# Patient Record
Sex: FEMALE | Race: WHITE | NOT HISPANIC OR LATINO | Employment: FULL TIME | URBAN - METROPOLITAN AREA
[De-identification: names, ages, dates, MRNs, and addresses within clinical notes are randomized per-mention and may not be internally consistent; named-entity substitution may affect disease eponyms.]

---

## 2018-08-11 ENCOUNTER — APPOINTMENT (EMERGENCY)
Dept: RADIOLOGY | Facility: HOSPITAL | Age: 38
End: 2018-08-11

## 2018-08-11 ENCOUNTER — HOSPITAL ENCOUNTER (EMERGENCY)
Facility: HOSPITAL | Age: 38
Discharge: HOME | End: 2018-08-12
Attending: EMERGENCY MEDICINE | Admitting: EMERGENCY MEDICINE

## 2018-08-11 VITALS
SYSTOLIC BLOOD PRESSURE: 148 MMHG | OXYGEN SATURATION: 94 % | TEMPERATURE: 98.4 F | DIASTOLIC BLOOD PRESSURE: 78 MMHG | HEART RATE: 100 BPM | RESPIRATION RATE: 16 BRPM

## 2018-08-11 DIAGNOSIS — S00.81XA ABRASION OF FACE, INITIAL ENCOUNTER: ICD-10-CM

## 2018-08-11 DIAGNOSIS — S06.0X1A CONCUSSION WITH LOSS OF CONSCIOUSNESS OF 30 MINUTES OR LESS, INITIAL ENCOUNTER: Primary | ICD-10-CM

## 2018-08-11 DIAGNOSIS — F10.920 ALCOHOLIC INTOXICATION WITHOUT COMPLICATION (HCC): ICD-10-CM

## 2018-08-11 DIAGNOSIS — S02.5XXA CLOSED FRACTURE OF TOOTH, INITIAL ENCOUNTER: ICD-10-CM

## 2018-08-11 PROCEDURE — 70486 CT MAXILLOFACIAL W/O DYE: CPT

## 2018-08-11 PROCEDURE — 72125 CT NECK SPINE W/O DYE: CPT

## 2018-08-11 PROCEDURE — 70450 CT HEAD/BRAIN W/O DYE: CPT

## 2018-08-11 PROCEDURE — 93005 ELECTROCARDIOGRAM TRACING: CPT

## 2018-08-12 LAB
ATRIAL RATE: 100 BPM
P AXIS: 71 DEGREES
PR INTERVAL: 154 MS
QRS AXIS: 69 DEGREES
QRSD INTERVAL: 74 MS
QT INTERVAL: 324 MS
QTC INTERVAL: 417 MS
T WAVE AXIS: 21 DEGREES
VENTRICULAR RATE: 100 BPM

## 2018-08-12 PROCEDURE — 93010 ELECTROCARDIOGRAM REPORT: CPT | Performed by: INTERNAL MEDICINE

## 2018-08-12 PROCEDURE — 99284 EMERGENCY DEPT VISIT MOD MDM: CPT

## 2018-08-12 NOTE — DISCHARGE INSTRUCTIONS
Concussion   WHAT YOU NEED TO KNOW:   A concussion is a mild brain injury  It is usually caused by a bump or blow to the head from a fall, a motor vehicle crash, or a sports injury  Sometimes being shaken forcefully may cause a concussion  DISCHARGE INSTRUCTIONS:   Have someone else call 911 for the following:   · Someone tries to wake you and cannot do so  · You have a seizure, increasing confusion, or a change in personality  · Your speech becomes slurred, or you have new vision problems  Seek care immediately if:   · You have a severe headache that does not go away  · You have arm or leg weakness, numbness, or new problems with coordination  · You have blood or clear fluid coming out of the ears or nose  Contact your healthcare provider if:   · You have nausea or are vomiting  · You feel more sleepy than usual     · Your symptoms get worse  · Your symptoms last longer than 6 weeks after the injury  · You have questions or concerns about your condition or care  Medicines:   · Acetaminophen  helps to decrease pain  It is available without a doctor's order  Ask how much to take and how often to take it  Follow directions  Acetaminophen can cause liver damage if not taken correctly  · NSAIDs , such as ibuprofen, help decrease swelling and pain  NSAIDs can cause stomach bleeding or kidney problems in certain people  If you take blood thinner medicine, always ask your healthcare provider if NSAIDs are safe for you  Always read the medicine label and follow directions  · Take your medicine as directed  Contact your healthcare provider if you think your medicine is not helping or if you have side effects  Tell him or her if you are allergic to any medicine  Keep a list of the medicines, vitamins, and herbs you take  Include the amounts, and when and why you take them  Bring the list or the pill bottles to follow-up visits   Carry your medicine list with you in case of an emergency  Follow up with your healthcare provider as directed:  Write down your questions so you remember to ask them during your visits  Self-care:   · Rest  from physical and mental activities as directed  Mental activities are those that require thinking, concentration, and attention  You will need to rest until your symptoms are gone  Rest will allow you to recover from your concussion  Ask your healthcare provider when you can return to work and other daily activities  · Have someone stay with you for the first 24 hours after your injury  Your healthcare provider should be contacted if your symptoms get worse, or you develop new symptoms  · Do not participate in sports and physical activities until your healthcare provider says it is okay  They could make your symptoms worse or lead to another concussion  Your healthcare provider will tell you when it is okay for you to return to sports or physical activities  Prevent another concussion:   · Wear protective sports equipment that fit properly  Helmets help decrease your risk of a serious brain injury  Talk to your healthcare provider about ways that can decrease your risk for a concussion if you play sports  · Wear your seat belt  every time you travel  This helps to decrease your risk of a head injury if you are in a car accident  © 2017 [address] [facility] Information is for End User's use only and may not be sold, redistributed or otherwise used for commercial purposes  All illustrations and images included in CareNotes® are the copyrighted property of [employer] , [employer]  or Kurt Cortez  The above information is an  only  It is not intended as medical advice for individual conditions or treatments  Talk to your doctor, nurse or pharmacist before following any medical regimen to see if it is safe and effective for you

## 2018-08-12 NOTE — ED PROVIDER NOTES
History  Chief Complaint   Patient presents with   Murlene Mariposa onto ground from standing at [facility]   Pt  states she has been drinking alcohol tonight  Fall was witnessed by PD, who stated she had a brief syncopal episode     Patient is a 53-year-old female with no pertinent past medical history/no daily medications presenting to the ED today for a witnessed fall by police at [facility])   Patient states she was drinking today [facility]) when she was walking back to her car stumbled and hit her head, she states she remembers everything that happened and that she was not going to drive she was just getting back to her car  Police state that she had a transient syncopal event, by the time they go to her she was awake and oriented, they brought her in for evaluation  At the current time patient is complaining of left cheek, head pain and upper lip pain  Denies dizziness/lightheadedness, neck pain, chest pain, shortness of breath, abdominal pain, nausea/vomiting, diarrhea/constipation, dysuria, arthralgias, focal neurologic deficits, mandibular or tooth pain  History provided by:  Patient and police   used: No        None       History reviewed  No pertinent past medical history  No past surgical history on file  History reviewed  No pertinent family history  I have reviewed and agree with the history as documented  Social History   Substance Use Topics    Smoking status: Not on file    Smokeless tobacco: Not on file    Alcohol use Not on file        Review of Systems   Constitutional: Negative for activity change, diaphoresis, fatigue and fever  HENT: Positive for facial swelling (Left cheek)  Negative for mouth sores, nosebleeds, postnasal drip, rhinorrhea, sinus pain, sinus pressure, sneezing and sore throat  Eyes: Negative for photophobia, pain, redness and visual disturbance     Respiratory: Negative for apnea, cough, chest tightness, shortness of breath, wheezing and stridor  Cardiovascular: Negative for chest pain, palpitations and leg swelling  Gastrointestinal: Negative for abdominal distention, abdominal pain, constipation, diarrhea, nausea and vomiting  Genitourinary: Negative for difficulty urinating, dysuria, frequency and urgency  Musculoskeletal: Negative for arthralgias, back pain, gait problem, joint swelling, myalgias, neck pain and neck stiffness  Skin: Negative for wound  Neurological: Negative for dizziness, weakness, light-headedness, numbness and headaches  Physical Exam  ED Triage Vitals [08/11/18 2211]   Temperature Pulse Resp Blood Pressure SpO2   98 4 °F (36 9 °C) (!) 108 -- 159/79 94 %      Temp Source Heart Rate Source Patient Position - Orthostatic VS BP Location FiO2 (%)   Oral Monitor Sitting Right arm --      Pain Score       --           Orthostatic Vital Signs  Vitals:    08/11/18 2211   BP: 159/79   Pulse: (!) 108   Patient Position - Orthostatic VS: Sitting       Physical Exam   Constitutional: She appears well-developed and well-nourished  No distress  HENT:   Head: Normocephalic  Not macrocephalic and not microcephalic  Head is with contusion  Head is without raccoon's eyes, without Aguilar's sign, without abrasion, without laceration, without right periorbital erythema and without left periorbital erythema  Hair is normal        Right Ear: External ear normal    Left Ear: External ear normal    Nose: Nose normal    Mouth/Throat: Abnormal dentition (Fracture of the right upper central incisor)  Two contusions with swelling on the face 1 overlying the zygoma the other on the midline upper lip  No abnormal facial mobility noted  No wound through the upper lip  Eyes: Conjunctivae and EOM are normal  Pupils are equal, round, and reactive to light  Right eye exhibits no discharge  Left eye exhibits no discharge  No scleral icterus  Neck: Trachea normal and phonation normal  Neck supple   No tracheal tenderness, no spinous process tenderness and no muscular tenderness present  No neck rigidity  No tracheal deviation, no edema and no erythema present  Patient placed in C-collar as she is unable to be cleared by nexus due to intoxication   Cardiovascular: Normal rate, regular rhythm, normal heart sounds and intact distal pulses  Exam reveals no gallop and no friction rub  No murmur heard  Pulmonary/Chest: Effort normal and breath sounds normal  No stridor  No respiratory distress  She has no wheezes  She has no rales  Abdominal: Soft  Bowel sounds are normal  She exhibits no distension and no mass  There is no tenderness  There is no guarding  Musculoskeletal: Normal range of motion  She exhibits no edema, tenderness or deformity  Neurological: She is alert  No cranial nerve deficit or sensory deficit  She exhibits normal muscle tone  Patient oriented to place, self but not to year (multiple times patient states it is 2014)   Skin: Skin is warm and dry  Capillary refill takes less than 2 seconds  No rash noted  She is not diaphoretic  No erythema  No pallor  Psychiatric: Her speech is normal and behavior is normal  Thought content normal  Her mood appears anxious  Nursing note and vitals reviewed  ED Medications  Medications - No data to display    Diagnostic Studies  Results Reviewed     Procedure Component Value Units Date/Time    POCT pregnancy, urine [[id number]]     Lab Status:  No result                  CT facial bones without contrast   Final Result by Segundo Ryder MD (08/11 2321)      No facial bone fracture or subluxation  Workstation performed: [id number]         CT cervical spine without contrast   Final Result by Sean Betancourt DO (08/11 2320)      No cervical spine fracture or traumatic malalignment                     Workstation performed: [id number]         CT head without contrast   Final Result by Segundo Ryder MD (08/11 2317)      No acute intracranial abnormality  Workstation performed: [id number]               Procedures  Procedures      Phone Consults  ED Phone Contact    ED Course                               MDM  Number of Diagnoses or Management Options  Abrasion of face, initial encounter:   Alcoholic intoxication without complication [facility]):   Closed fracture of tooth, initial encounter:   Concussion with loss of consciousness of 30 minutes or less, initial encounter:      Amount and/or Complexity of Data Reviewed  Tests in the radiology section of CPT®: ordered and reviewed  Tests in the medicine section of CPT®: ordered and reviewed    Risk of Complications, Morbidity, and/or Mortality  Presenting problems: moderate  Diagnostic procedures: low  Management options: low  General comments: Due to patient's presentation and unknown cause with not full orientation explain the patient that we will do a head CT, neck CT, facial bone CT  Patient is agreeable to this and she will now be discharged without these unless she waits to become clinically sober  Discussed with patient that since she feels like her front teeth are a little loose explain the patient that the next few days she should eat soft foods and follow up with dentist   Patient understands and agrees with this  States last tetanus shot was 5  years ago  Patient's mother is coming to pick her up as patient is too intoxicated to drive home by herself      Patient Progress  Patient progress: stable    CritCare Time    Disposition  Final diagnoses:   Concussion with loss of consciousness of 30 minutes or less, initial encounter   Closed fracture of tooth, initial encounter   Alcoholic intoxication without complication ([address] )   Abrasion of face, initial encounter     Time reflects when diagnosis was documented in both MDM as applicable and the Disposition within this note     Time User Action Codes Description Comment    8/11/2018 11:42 PM Kirk Odom [S06 [id number]] Concussion with loss of consciousness of 30 minutes or less, initial encounter     8/11/2018 11:43 PM Halvorsen, Wilkins Shone Add [S02  [id number]] Closed fracture of tooth, initial encounter     8/11/2018 11:43 PM Pratik Lua Add [B01 [id number]] Alcoholic intoxication without complication ([facility] [address] )     6/05/9601 11:43 PM Halvorsen, Wilkins Shone Add [S00 81XA] Abrasion of face, initial encounter       ED Disposition     ED Disposition Condition Comment    Discharge  Arslan Comfort discharge to home/self care  Condition at discharge: Stable        Follow-up Information     Follow up With Specialties Details Why Contact Info Additional Information    Dentist  In 2 days       [address] [place] [address] Emergency Department Emergency Medicine  As needed, If symptoms worsen [address] [facility] [address] [facility] ED, [address], [place], [facility]          Patient's Medications    No medications on file     No discharge procedures on file  ED Provider  Attending physically available and evaluated Arslan Comfort  I managed the patient along with the ED Attending      Electronically Signed by         Nimisha Perez DO  08/11/18 8963

## 2018-08-12 NOTE — ED NOTES
Patient transported to CT, [facility] [place] with no POCT pregnancy     Campos Bruce RN  08/11/18 8978

## 2018-08-13 NOTE — ED ATTENDING ATTESTATION
Sachi Alberto MD, saw and evaluated the patient  I have discussed the patient with the resident/non-physician practitioner and agree with the resident's/non-physician practitioner's findings, Plan of Care, and MDM as documented in the resident's/non-physician practitioner's note, except where noted  All available labs and Radiology studies were reviewed  At this point I agree with the current assessment done in the Emergency Department  I have conducted an independent evaluation of this patient a history and physical is as follows:   intoxicated patient had music vest tripped and fell, striking her face on the ground  Patient did not lose consciousness, is concerned because she believes she chipped her teeth  Patient complains of pain to her face  She denies difficulty with moving her jaw  She denies headache or neck pain  The patient believes she is up-to-date on her tetanus  She denies chest pain, or shortness of breath  The patient is not on any blood thinners  She is otherwise healthy  Review of systems otherwise negative in 12 systems reviewed  On exam the patient has abrasions to her face involving her upper lip and cheek  Her eyes are intact  Her pupils are round reactive to light  Oropharynx is clear with no malocclusion  The patient is handling her secretions  Her TMs are normal bilaterally  Her neck is supple and nontender  Trachea is midline  Her heart is regular without murmurs, rubs, gallops  Lungs are clear and equal with no wheezes, rales, rhonchi  She has full range of motion her extremities  Abdomen is benign  She has a normal back exam with no other signs of trauma  Impression: Facial abrasions, head injury, alcohol intoxication  Will plan to CT head and face as well as neck      Critical Care Time  CritCare Time    Procedures